# Patient Record
Sex: MALE | Race: WHITE | NOT HISPANIC OR LATINO | URBAN - METROPOLITAN AREA
[De-identification: names, ages, dates, MRNs, and addresses within clinical notes are randomized per-mention and may not be internally consistent; named-entity substitution may affect disease eponyms.]

---

## 2017-01-17 ENCOUNTER — APPOINTMENT (OUTPATIENT)
Dept: MEDICAL GROUP | Facility: MEDICAL CENTER | Age: 57
End: 2017-01-17
Payer: COMMERCIAL

## 2017-03-06 ENCOUNTER — OFFICE VISIT (OUTPATIENT)
Dept: MEDICAL GROUP | Facility: PHYSICIAN GROUP | Age: 57
End: 2017-03-06
Payer: COMMERCIAL

## 2017-03-06 VITALS
HEART RATE: 51 BPM | BODY MASS INDEX: 29.51 KG/M2 | SYSTOLIC BLOOD PRESSURE: 120 MMHG | WEIGHT: 188 LBS | HEIGHT: 67 IN | DIASTOLIC BLOOD PRESSURE: 86 MMHG | TEMPERATURE: 97.7 F | OXYGEN SATURATION: 97 %

## 2017-03-06 DIAGNOSIS — R74.8 ELEVATED LIVER ENZYMES: ICD-10-CM

## 2017-03-06 DIAGNOSIS — M25.551 RIGHT HIP PAIN: ICD-10-CM

## 2017-03-06 DIAGNOSIS — Z12.5 PROSTATE CANCER SCREENING: ICD-10-CM

## 2017-03-06 DIAGNOSIS — G89.29 CHRONIC RIGHT SHOULDER PAIN: ICD-10-CM

## 2017-03-06 DIAGNOSIS — E78.2 MIXED HYPERLIPIDEMIA: ICD-10-CM

## 2017-03-06 DIAGNOSIS — Z13.1 SCREENING FOR DIABETES MELLITUS (DM): ICD-10-CM

## 2017-03-06 DIAGNOSIS — M25.511 CHRONIC RIGHT SHOULDER PAIN: ICD-10-CM

## 2017-03-06 PROBLEM — M79.641 RIGHT HAND PAIN: Status: ACTIVE | Noted: 2017-03-06

## 2017-03-06 PROCEDURE — 99214 OFFICE O/P EST MOD 30 MIN: CPT | Performed by: INTERNAL MEDICINE

## 2017-03-06 RX ORDER — SIMVASTATIN 10 MG
10 TABLET ORAL EVERY EVENING
Qty: 90 TAB | Refills: 0 | Status: SHIPPED | OUTPATIENT
Start: 2017-03-06 | End: 2017-03-06 | Stop reason: SDUPTHER

## 2017-03-06 RX ORDER — PRAVASTATIN SODIUM 20 MG
20 TABLET ORAL DAILY
Qty: 90 TAB | Refills: 0 | Status: SHIPPED | OUTPATIENT
Start: 2017-03-06 | End: 2017-03-06 | Stop reason: SDUPTHER

## 2017-03-06 RX ORDER — SIMVASTATIN 10 MG
10 TABLET ORAL EVERY EVENING
Qty: 90 TAB | Refills: 2 | Status: SHIPPED | OUTPATIENT
Start: 2017-03-06 | End: 2017-03-06

## 2017-03-06 RX ORDER — PRAVASTATIN SODIUM 20 MG
20 TABLET ORAL DAILY
Qty: 90 TAB | Refills: 2 | Status: SHIPPED | OUTPATIENT
Start: 2017-03-06 | End: 2017-10-07 | Stop reason: SDUPTHER

## 2017-03-07 NOTE — ASSESSMENT & PLAN NOTE
Pt has known DLD and is on simvastatin 10 mg daily. He had labs done in 09/2016. Patient was on medication for 3 months but states that he was not able to get the medication refilled and therefore decided to come in today.     Patient does report myalgias that occurred at the end of his 3 months of simvastatin. He had elevated liver enzymes seen on these labs as well.

## 2017-03-07 NOTE — PROGRESS NOTES
Chief Complaint   Patient presents with   • Establish Care         HISTORY OF THE PRESENT ILLNESS: Patient is a 56 y.o. male. This pleasant patient is here today for dyslipidemia, right hip pain, right shoulder pain      Mixed hyperlipidemia  Pt has known DLD and is on simvastatin 10 mg daily. He had labs done in 09/2016. Patient was on medication for 3 months but states that he was not able to get the medication refilled and therefore decided to come in today.     Patient does report myalgias that occurred at the end of his 3 months of simvastatin. He had elevated liver enzymes seen on these labs as well.     Right hip pain  Pt reports right hip pain that has been present for about a year and has been gradually worsening since that time. It radiates down the posterior thigh as well. He is not able to sit down while driving due to the pain. He denies associated back pain, numbness/weakness    There was no trauma that preceded the pain.     Chronic right shoulder pain  Pt reports right shoulder pain that is worse when he lays on the shoulder or puts pressure on it. He occasionally gets weakness and numbness of the right hand. He tries aleve with some improvement in symptoms.       Allergies: Penicillin g    Current Outpatient Prescriptions Ordered in [facility]   Medication Sig Dispense Refill   • pravastatin (PRAVACHOL) 20 MG Tab Take 1 Tab by mouth every day. 90 Tab 2     No current Epic-ordered facility-administered medications on file.       Past Medical History   Diagnosis Date   • Hyperlipidemia        Past Surgical History   Procedure Laterality Date   • Appendectomy         Social History   Substance Use Topics   • Smoking status: Never Smoker    • Smokeless tobacco: Never Used   • Alcohol Use: 0.0 oz/week     0 Standard drinks or equivalent per week      Comment: occasional       Family History   Problem Relation Age of Onset   • Psychiatry Father        Review of Systems :    All other systems reviewed and are  "negative except as in HPI.      Exam: Blood pressure 120/86, pulse 51, temperature 36.5 °C (97.7 °F), height 1.702 m (5' 7\"), weight 85.276 kg (188 lb), SpO2 97 %.    Blood pressure 120/86, pulse 51, temperature 36.5 °C (97.7 °F), height 1.702 m (5' 7\"), weight 85.276 kg (188 lb), SpO2 97 %. Body mass index is 29.44 kg/(m^2).   Physical Exam:  Constitutional: Alert & oriented, no acute distress  Eye: Equal, round and reactive, conjunctiva clear, lids normal  ENMT: Lips without lesions, good dentition, oropharynx clear  Neck: Trachea midline, no masses, no thyromegaly. No cervical or supraclavicular lymphadenopathy  Respiratory: Unlabored respiratory effort, lungs clear to auscultation, no wheezes, no ronchi  Cardiovascular: Normal S1, S2, no murmur, no edema  MSK: No tenderness to palpation of hip. No pain with external rotation of hip  Neuro: No overt focal neurologic deficits, normal gait  Psych: Normal mood and affect, judgement normal  Musculoskeletal: Normal gait. No extremity cyanosis, clubbing, or edema.    Assessment/Plan  1. Mixed hyperlipidemia  Will change patient to pravastatin and recheck labs  - CBC WITH DIFFERENTIAL; Future  - LIPID PROFILE; Future    2. Right hip pain  Will obtain x-ray and refer patient to orthopedics  - DX-HIP-UNILATERAL-W/O PELVIS-2/3 VIEWS RIGHT; Future  - REFERRAL TO ORTHOPEDICS    3. Chronic right shoulder pain  Refer to orthopedics as above    4. Screening for diabetes mellitus (DM)  - COMP METABOLIC PANEL; Future    5. Prostate cancer screening  - PROSTATE SPECIFIC AG SCREENING; Future    6. Elevated liver enzymes  Recheck labs      Return in about 6 months (around 9/6/2017).    Please note that this dictation was created using voice recognition software. I have made every reasonable attempt to correct obvious errors, but I expect that there are errors of grammar and possibly content that I did not discover before finalizing the note.     "

## 2017-03-07 NOTE — ASSESSMENT & PLAN NOTE
Pt reports right hip pain that has been present for about a year and has been gradually worsening since that time. It radiates down the posterior thigh as well. He is not able to sit down while driving due to the pain. He denies associated back pain, numbness/weakness    There was no trauma that preceded the pain.

## 2017-03-07 NOTE — ASSESSMENT & PLAN NOTE
Pt reports right shoulder pain that is worse when he lays on the shoulder or puts pressure on it. He occasionally gets weakness and numbness of the right hand. He tries aleve with some improvement in symptoms.

## 2017-03-11 ENCOUNTER — HOSPITAL ENCOUNTER (OUTPATIENT)
Dept: RADIOLOGY | Facility: MEDICAL CENTER | Age: 57
End: 2017-03-11
Attending: INTERNAL MEDICINE
Payer: COMMERCIAL

## 2017-03-11 DIAGNOSIS — M25.551 RIGHT HIP PAIN: ICD-10-CM

## 2017-03-11 PROCEDURE — 73502 X-RAY EXAM HIP UNI 2-3 VIEWS: CPT | Mod: RT

## 2017-03-13 ENCOUNTER — TELEPHONE (OUTPATIENT)
Dept: MEDICAL GROUP | Facility: PHYSICIAN GROUP | Age: 57
End: 2017-03-13

## 2017-03-13 NOTE — Clinical Note
March 13, 2017         Jn Oliver  [address]   [facility] [address]        Dear Jn:      X-ray hip results reviewed and it is negative/normal with no fracture or abnormal alignment. You can see orthopedics as we discussed in clinic.         Resulted Orders   DX-HIP-UNILATERAL-W/O PELVIS-2/3 VIEWS RIGHT    Narrative    3/11/2017 10:06 AM    HISTORY/REASON FOR EXAM:  Chronic right hip pain without known trauma.      TECHNIQUE/EXAM DESCRIPTION AND NUMBER OF VIEWS:  2 views of the RIGHT hip.    COMPARISON: None    FINDINGS:  There is no acute fracture or abnormal alignment of the right hip. The femoral head contour is smooth. The joint space is normal.    The left hip joint and bony pelvis are also unremarkable. SI joints are symmetric.      Impression    1.  Unremarkable right hip series.         If you have any questions or concerns, please don't hesitate to call.        Sincerely,      Vasquez Bland M.D.    Electronically Signed

## 2017-03-13 NOTE — TELEPHONE ENCOUNTER
----- Message from Vasquez Bland M.D. sent at 3/13/2017  9:21 AM PDT -----  X-ray hip results reviewed and it is negative/normal with no fracture or abnormal alignment. Pt can see orthopedics as we discussed in clinic. Please call patient and inform them. Thank you  - Dr. Bland

## 2017-04-28 ENCOUNTER — HOSPITAL ENCOUNTER (OUTPATIENT)
Dept: RADIOLOGY | Facility: MEDICAL CENTER | Age: 57
End: 2017-04-28
Attending: ORTHOPAEDIC SURGERY
Payer: COMMERCIAL

## 2017-04-28 DIAGNOSIS — M25.551 RIGHT HIP PAIN: ICD-10-CM

## 2017-04-28 PROCEDURE — 700101 HCHG RX REV CODE 250

## 2017-04-28 PROCEDURE — 700111 HCHG RX REV CODE 636 W/ 250 OVERRIDE (IP)

## 2017-04-28 PROCEDURE — 20610 DRAIN/INJ JOINT/BURSA W/O US: CPT

## 2017-04-28 PROCEDURE — A9579 GAD-BASE MR CONTRAST NOS,1ML: HCPCS | Performed by: ORTHOPAEDIC SURGERY

## 2017-04-28 PROCEDURE — 27093 INJECTION FOR HIP X-RAY: CPT | Mod: RT

## 2017-04-28 PROCEDURE — 73722 MRI JOINT OF LWR EXTR W/DYE: CPT | Mod: RT

## 2017-04-28 PROCEDURE — 700117 HCHG RX CONTRAST REV CODE 255: Performed by: ORTHOPAEDIC SURGERY

## 2017-04-28 RX ADMIN — GADODIAMIDE 10 ML: 287 INJECTION INTRAVENOUS at 14:00

## 2017-04-28 RX ADMIN — IOHEXOL 50 ML: 300 INJECTION, SOLUTION INTRAVENOUS at 14:00

## 2017-05-20 ENCOUNTER — APPOINTMENT (OUTPATIENT)
Dept: RADIOLOGY | Facility: MEDICAL CENTER | Age: 57
End: 2017-05-20
Attending: PHYSICIAN ASSISTANT
Payer: COMMERCIAL

## 2017-08-10 LAB
ALBUMIN SERPL-MCNC: 4.6 G/DL (ref 3.5–5.5)
ALBUMIN/GLOB SERPL: 1.6 {RATIO} (ref 1.2–2.2)
ALP SERPL-CCNC: 48 IU/L (ref 39–117)
ALT SERPL-CCNC: 80 IU/L (ref 0–44)
AST SERPL-CCNC: 73 IU/L (ref 0–40)
BASOPHILS # BLD AUTO: 0 X10E3/UL (ref 0–0.2)
BASOPHILS NFR BLD AUTO: 0 %
BILIRUB SERPL-MCNC: 0.7 MG/DL (ref 0–1.2)
BUN SERPL-MCNC: 14 MG/DL (ref 6–24)
BUN/CREAT SERPL: 17 (ref 9–20)
CALCIUM SERPL-MCNC: 9.9 MG/DL (ref 8.7–10.2)
CHLORIDE SERPL-SCNC: 101 MMOL/L (ref 96–106)
CHOLEST SERPL-MCNC: 213 MG/DL (ref 100–199)
CO2 SERPL-SCNC: 23 MMOL/L (ref 18–29)
COMMENT 011824: ABNORMAL
CREAT SERPL-MCNC: 0.81 MG/DL (ref 0.76–1.27)
EOSINOPHIL # BLD AUTO: 0.1 X10E3/UL (ref 0–0.4)
EOSINOPHIL NFR BLD AUTO: 2 %
ERYTHROCYTE [DISTWIDTH] IN BLOOD BY AUTOMATED COUNT: 13.7 % (ref 12.3–15.4)
GLOBULIN SER CALC-MCNC: 2.8 G/DL (ref 1.5–4.5)
GLUCOSE SERPL-MCNC: 106 MG/DL (ref 65–99)
HCT VFR BLD AUTO: 48.6 % (ref 37.5–51)
HDLC SERPL-MCNC: 34 MG/DL
HGB BLD-MCNC: 16 G/DL (ref 12.6–17.7)
IMM GRANULOCYTES # BLD: 0 X10E3/UL (ref 0–0.1)
IMM GRANULOCYTES NFR BLD: 0 %
IMMATURE CELLS  115398: ABNORMAL
LDLC SERPL CALC-MCNC: 139 MG/DL (ref 0–99)
LYMPHOCYTES # BLD AUTO: 1.9 X10E3/UL (ref 0.7–3.1)
LYMPHOCYTES NFR BLD AUTO: 42 %
MCH RBC QN AUTO: 31.4 PG (ref 26.6–33)
MCHC RBC AUTO-ENTMCNC: 32.9 G/DL (ref 31.5–35.7)
MCV RBC AUTO: 96 FL (ref 79–97)
MONOCYTES # BLD AUTO: 0.3 X10E3/UL (ref 0.1–0.9)
MONOCYTES NFR BLD AUTO: 8 %
MORPHOLOGY BLD-IMP: ABNORMAL
NEUTROPHILS # BLD AUTO: 2.2 X10E3/UL (ref 1.4–7)
NEUTROPHILS NFR BLD AUTO: 48 %
NRBC BLD AUTO-RTO: ABNORMAL %
PLATELET # BLD AUTO: 148 X10E3/UL (ref 150–379)
POTASSIUM SERPL-SCNC: 4.6 MMOL/L (ref 3.5–5.2)
PROT SERPL-MCNC: 7.4 G/DL (ref 6–8.5)
PSA SERPL-MCNC: 2.5 NG/ML (ref 0–4)
RBC # BLD AUTO: 5.09 X10E6/UL (ref 4.14–5.8)
SODIUM SERPL-SCNC: 142 MMOL/L (ref 134–144)
TRIGL SERPL-MCNC: 202 MG/DL (ref 0–149)
VLDLC SERPL CALC-MCNC: 40 MG/DL (ref 5–40)
WBC # BLD AUTO: 4.6 X10E3/UL (ref 3.4–10.8)

## 2017-08-25 ENCOUNTER — OFFICE VISIT (OUTPATIENT)
Dept: MEDICAL GROUP | Facility: PHYSICIAN GROUP | Age: 57
End: 2017-08-25
Payer: COMMERCIAL

## 2017-08-25 VITALS
WEIGHT: 187 LBS | SYSTOLIC BLOOD PRESSURE: 128 MMHG | TEMPERATURE: 97.7 F | RESPIRATION RATE: 16 BRPM | HEIGHT: 67 IN | OXYGEN SATURATION: 98 % | DIASTOLIC BLOOD PRESSURE: 86 MMHG | HEART RATE: 74 BPM | BODY MASS INDEX: 29.35 KG/M2

## 2017-08-25 DIAGNOSIS — E78.2 MIXED HYPERLIPIDEMIA: ICD-10-CM

## 2017-08-25 DIAGNOSIS — R73.01 ABNORMAL FASTING GLUCOSE: ICD-10-CM

## 2017-08-25 DIAGNOSIS — M25.551 RIGHT HIP PAIN: ICD-10-CM

## 2017-08-25 DIAGNOSIS — R74.8 ELEVATED LIVER ENZYMES: ICD-10-CM

## 2017-08-25 PROCEDURE — 99214 OFFICE O/P EST MOD 30 MIN: CPT | Performed by: INTERNAL MEDICINE

## 2017-08-25 NOTE — ASSESSMENT & PLAN NOTE
Pt has elevated liver enzymes seen again on labs. He has had this for many years per chart review. Pt reports he had a liver biopsy done at [facility] and states it was negative. He reports he drinks about 2 drinks per week. He was told in the past he had fatty liver.

## 2017-08-25 NOTE — ASSESSMENT & PLAN NOTE
Pt is on pravastatin 20 mg daily for this. He had labs done that show elevated cholesterol labs from before. He gets mild myalgia at night but states it's tolerable. It only happens about twice per week. He does have mild liver enzyme elevation seen on labs.

## 2017-08-25 NOTE — ASSESSMENT & PLAN NOTE
Pt is now following with orthopedic surgery for this. He underwent an injection a couple of months ago with steroid and states the pain has resolved since then.

## 2017-08-25 NOTE — PROGRESS NOTES
"Subjective:   Jn Oliver is a 56 y.o. male here today for hyperlipidemia, hip pain, elevated liver enzymes    Mixed hyperlipidemia  Pt is on pravastatin 20 mg daily for this. He had labs done that show elevated cholesterol labs from before. He gets mild myalgia at night but states it's tolerable. It only happens about twice per week. He does have mild liver enzyme elevation seen on labs.     Right hip pain  Pt is now following with orthopedic surgery for this. He underwent an injection a couple of months ago with steroid and states the pain has resolved since then.     Elevated liver enzymes  Pt has elevated liver enzymes seen again on labs. He has had this for many years per chart review. Pt reports he had a liver biopsy done at [facility] and states it was negative. He reports he drinks about 2 drinks per week. He was told in the past he had fatty liver.          Current medicines (including changes today)  Current Outpatient Prescriptions   Medication Sig Dispense Refill   • pravastatin (PRAVACHOL) 20 MG Tab Take 1 Tab by mouth every day. 90 Tab 2     No current facility-administered medications for this visit.     He  has a past medical history of Hyperlipidemia.    ROS   + myalgia, Denies nausea/vomiting, abdomina pain, constipation/diarrhea       Objective:     Blood pressure 128/86, pulse 74, temperature 36.5 °C (97.7 °F), resp. rate 16, height 1.702 m (5' 7\"), weight 84.823 kg (187 lb), SpO2 98 %. Body mass index is 29.28 kg/(m^2).   Physical Exam:  Constitutional: Alert & oriented, no acute distress  Eye: Conjunctiva clear, lids normal, no discharge  ENMT: Lips without lesions, normal external nose and ears  Respiratory: Unlabored respiratory effort, lungs clear to auscultation, no wheezes, no ronchi  Cardiovascular: Normal S1, S2, no murmur  Abdomen: Soft, no tenderness or rebound, no hepatosplenomegaly  Skin: Warm, dry, good turgor, no rashes in visible areas  Neuro: No overt focal neurologic " deficits  Psych: Normal mood and affect      Assessment and Plan:   The following treatment plan was discussed    1. Mixed hyperlipidemia  Continue pravastatin. Given myalgia I do not want to increase dose. Vision therefore counseled on improving diet and getting regular exercise to try and improve numbers. We'll recheck labs prior to follow-up in 4-6 months  - CBC WITH DIFFERENTIAL; Future  - LIPID PROFILE; Future    2. Right hip pain  Continue treatment per orthopedic surgery    3. Elevated liver enzymes  Etiology likely fatty liver disease as patient reports he was diagnosed with this in the past. We'll get ultrasound for further evaluation and tests for hepatitis and hemochromatosis.  - US-LIVER AND BILIARY TREE; Future  - HEPATITIS PANEL ACUTE(4 COMPONENTS); Future  - IRON/TOTAL IRON BIND; Future  - FERRITIN; Future  - COMP METABOLIC PANEL; Future  - TSH WITH REFLEX TO FT4; Future    4. Abnormal fasting glucose  Seen on labs. Patient counseled on importance of diet and exercise as above. We'll repeat labs prior to follow-up        Followup: Return in about 4 months (around 12/25/2017).    Please note that this dictation was created using voice recognition software. I have made every reasonable attempt to correct obvious errors, but I expect that there are errors of grammar and possibly content that I did not discover before finalizing the note.

## 2017-09-08 ENCOUNTER — HOSPITAL ENCOUNTER (OUTPATIENT)
Dept: RADIOLOGY | Facility: MEDICAL CENTER | Age: 57
End: 2017-09-08
Attending: INTERNAL MEDICINE
Payer: COMMERCIAL

## 2017-09-08 DIAGNOSIS — R74.8 ELEVATED LIVER ENZYMES: ICD-10-CM

## 2017-09-08 PROCEDURE — 76705 ECHO EXAM OF ABDOMEN: CPT

## 2017-09-11 ENCOUNTER — TELEPHONE (OUTPATIENT)
Dept: MEDICAL GROUP | Facility: PHYSICIAN GROUP | Age: 57
End: 2017-09-11

## 2017-09-11 NOTE — TELEPHONE ENCOUNTER
Pt explained that his current diet is good. He eats oatmeal everyday for breakfast, fish for lunch, and chicken or fish for dinner. His diet has no dairy products in it, He would like to know what else he has to improve on

## 2017-09-11 NOTE — TELEPHONE ENCOUNTER
That is great that he is eating healthy foods. The next step would be overall weight reduction. This is typically done by decreasing caloric intake or increasing exercise. A weight of 160 pounds would be the upper end of normal according to the Body Mass Index (BMI) table. If patient would like I can place a referral to our dietitian who can help with this as well. Thank you    Vasquez Bland M.D.

## 2017-09-12 ENCOUNTER — TELEPHONE (OUTPATIENT)
Dept: MEDICAL GROUP | Facility: PHYSICIAN GROUP | Age: 57
End: 2017-09-12

## 2017-09-12 LAB
FERRITIN SERPL-MCNC: 803 NG/ML (ref 30–400)
HAV IGM SERPL QL IA: NEGATIVE
HBV CORE IGM SERPL QL IA: NEGATIVE
HBV SURFACE AG SERPL QL IA: NEGATIVE
HCV AB S/CO SERPL IA: 0.1 S/CO RATIO (ref 0–0.9)
IRON SATN MFR SERPL: 33 % (ref 15–55)
IRON SERPL-MCNC: 121 UG/DL (ref 38–169)
TIBC SERPL-MCNC: 365 UG/DL (ref 250–450)
TSH SERPL DL<=0.005 MIU/L-ACNC: 1.77 UIU/ML (ref 0.45–4.5)
UIBC SERPL-MCNC: 244 UG/DL (ref 111–343)

## 2017-09-12 NOTE — TELEPHONE ENCOUNTER
----- Message from Vasquez Bland M.D. sent at 9/12/2017  7:37 AM PDT -----  Labs are negative for a disease called hemochromatosis or for hepatitis. These were checked because they can sometimes cause increased liver enzymes as well. Cause of the elevated liver enzymes is likely from fatty liver disease. Please call patient and inform them. Thank you  - Dr. Bland

## 2017-10-09 RX ORDER — PRAVASTATIN SODIUM 20 MG
TABLET ORAL
Qty: 90 TAB | Refills: 3 | Status: SHIPPED | OUTPATIENT
Start: 2017-10-09

## 2017-10-09 NOTE — TELEPHONE ENCOUNTER
Received refill request for pravastatin.  Pt was seen in clinic recently and is taking this medication for hyperlipidemia. Refill sent to pharmacy    Vasquez Bland M.D.

## 2022-12-02 NOTE — ASSESSMENT & PLAN NOTE
Seen on labs with glucose of 106   [PERRLA] : NAYELY [Lips] : normal lips [Gums] : normal gums [Oral] : normal oral cavity  [Mucosa] : moist and pink mucosa [Palate] : normal palate [S1, S2 Present] : S1, S2 present [Cardiac Auscultation] : normal cardiac auscultation  [Peripheral Pulses] : positive peripheral pulses [Respiratory Effort] : normal respiratory effort [Clear to auscultation] : clear to auscultation [Soft] : soft [NonTender] : non tender [Non Distended] : non distended [Normal Bowel Sounds] : normal bowel sounds [No Hepatosplenomegaly] : no hepatosplenomegaly [No Abnormal Lymph Nodes Palpated] : no abnormal lymph nodes palpated [Range Of Motion] : full range of motion [Intact Judgement] : intact judgement  [Insight Insight] : intact insight [Not Examined] : not examined [1] : 1 [Acute distress] : no acute distress [Rash] : no rash [Malar Erythema] : no malar erythema [Erythematous Conjunctiva] : nonerythematous conjunctiva [Erythematous Oropharynx] : nonerythematous oropharynx [Ulcers] : no ulcers [Lesions] : no lesions [Induration] : no induration [Erythematous] : not erythematous [Mass (___cm)] : no neck masses [Murmurs] : no murmurs [Peripheral Edema] : no peripheral edema  [Joint effusions] : no joint effusions [FreeTextEntry1] : interactive, pleasant affect [de-identified] : neg grind bilaterally, no effusions

## 2023-05-25 ENCOUNTER — APPOINTMENT (OUTPATIENT)
Dept: RADIOLOGY | Facility: MEDICAL CENTER | Age: 63
End: 2023-05-25
Attending: NURSE PRACTITIONER
Payer: COMMERCIAL

## 2023-06-05 ENCOUNTER — APPOINTMENT (OUTPATIENT)
Dept: RADIOLOGY | Facility: MEDICAL CENTER | Age: 63
End: 2023-06-05
Attending: NURSE PRACTITIONER
Payer: COMMERCIAL